# Patient Record
(demographics unavailable — no encounter records)

---

## 2024-10-08 NOTE — PHYSICAL EXAM
[de-identified] : left lateral rotation 75 degrees [TWNoteComboBox6] : right lateral rotation 75 degrees [] : positive Boom test reproduction of pain into groin [de-identified] : +SIJ compression test

## 2024-10-08 NOTE — HISTORY OF PRESENT ILLNESS
[Neck] : neck [10] : 10 [Dull/Aching] : dull/aching [Radiating] : radiating [Tightness] : tightness [Constant] : constant [Household chores] : household chores [Leisure] : leisure [Work] : work [Sleep] : sleep [Meds] : meds [Injection therapy] : injection therapy [FreeTextEntry1] : 10/08/2024: follow up today for C7-T1 GAYLE on 9/25/24 with 60% relief.  Pain better over the neck and trap however intensity improved. no weakness. no n/t. She completed PT and made her pain worse at the time. Celebrex PRN. The sensitivity at the base of her head has improved, but still present. (worried about occipital nerve block and interference with vertigo) Jaimee in [facility] lower back. Had MRI on 9/16/24: Stand up MRI:  L4/5 disc bulge and facet arthropathy, L4/S1 bulge and facet arthropathy.   08/26/2024: follow up today for follow up. Pain in the base of the neck.  Pain travels up the head and down to the trap. Pain tender when she lays down. Pain in the neck better with GAYLE's in the past.  Has had pain in the right lower back for many years, had DEBORAH's with limited relief in the past. In PT currently.  Had DEBORAH's in the past with Anusha. Taking Celebrex PRN.   05/13/2024: follow up today for GAYLE on 4/10 90% relief .  Has what sounds like occipital neuralgia.    03/12/2024: follow up today.  Pain is returning in both hands and arms.  Has numbness and tingling.  Takes Nurtec and celebrex.    12/06/2023 Follow up today C7-T1 GAYLE on 11/22/23 with 80% relief. Had a better experience with the sedation this time. Radicular pain is much better. No pain in the hands.   10/31/2023 : Patient presents for initial evaluation. She is having pain on her neck and the pain is also going to the head, shoulder and fingers. Had a GAYLE in May with little relief.  Had been seeing Dr. Tavares but insurance changed.  Has pain in head, shoulders and into fingers with numbness.   Takes celebrex.  Takes nurtec for migraines.  Botox for TMJ.  Has vestibular dysfunction with too much movement.  Headaches are returning.    Subjective Weakness: Yes Numbness/Tingling: Yes Bladder/Bowel dysfunction: No   Treatments Tried:   Physical therapy, acupuncture, Epidurals.    Attempted modalities for current pain complaint:  See above:    Medications: Yes Injections: Yes  (GAYLE ) 11/22/23, 4/10/24 Previous Spine Surgery: N/o   Imaging:  MRI Cervical Spine (7/27/22) -C2-3 C3-4 C4-5 C5-C6 C6-C7 disc herniations deforming the thecal sac with C3-4 C6-7 cord abutment cervical spine straightening slight right inferior cerebellar tonsillar displacement measuring approximately 1 to 2 mm associated with borderline tonsillar ectopia without evidence of syringohydromyelia C1-2 ligamentous hypertrophy deforming the thecal sac [4] : 4 [] : no [FreeTextEntry6] : pressure, headaches  [FreeTextEntry7] : back to the head [de-identified] : movement, working out arms.  [de-identified] : MRI

## 2024-10-08 NOTE — PHYSICAL EXAM
[de-identified] : left lateral rotation 75 degrees [TWNoteComboBox6] : right lateral rotation 75 degrees [] : positive Boom test reproduction of pain into groin [de-identified] : +SIJ compression test

## 2024-10-08 NOTE — HISTORY OF PRESENT ILLNESS
[Neck] : neck [10] : 10 [Dull/Aching] : dull/aching [Radiating] : radiating [Tightness] : tightness [Constant] : constant [Household chores] : household chores [Leisure] : leisure [Work] : work [Sleep] : sleep [Meds] : meds [Injection therapy] : injection therapy [FreeTextEntry1] : 10/08/2024: follow up today for C7-T1 GAYLE on 9/25/24 with 60% relief.  Pain better over the neck and trap however intensity improved. no weakness. no n/t. She completed PT and made her pain worse at the time. Celebrex PRN. The sensitivity at the base of her head has improved, but still present. (worried about occipital nerve block and interference with vertigo) Jaimee in [facility] lower back. Had MRI on 9/16/24: Stand up MRI:  L4/5 disc bulge and facet arthropathy, L4/S1 bulge and facet arthropathy.   08/26/2024: follow up today for follow up. Pain in the base of the neck.  Pain travels up the head and down to the trap. Pain tender when she lays down. Pain in the neck better with GAYLE's in the past.  Has had pain in the right lower back for many years, had DEBORAH's with limited relief in the past. In PT currently.  Had DEBORAH's in the past with Anusha. Taking Celebrex PRN.   05/13/2024: follow up today for GAYLE on 4/10 90% relief .  Has what sounds like occipital neuralgia.    03/12/2024: follow up today.  Pain is returning in both hands and arms.  Has numbness and tingling.  Takes Nurtec and celebrex.    12/06/2023 Follow up today C7-T1 GAYLE on 11/22/23 with 80% relief. Had a better experience with the sedation this time. Radicular pain is much better. No pain in the hands.   10/31/2023 : Patient presents for initial evaluation. She is having pain on her neck and the pain is also going to the head, shoulder and fingers. Had a GAYLE in May with little relief.  Had been seeing Dr. Tavares but insurance changed.  Has pain in head, shoulders and into fingers with numbness.   Takes celebrex.  Takes nurtec for migraines.  Botox for TMJ.  Has vestibular dysfunction with too much movement.  Headaches are returning.    Subjective Weakness: Yes Numbness/Tingling: Yes Bladder/Bowel dysfunction: No   Treatments Tried:   Physical therapy, acupuncture, Epidurals.    Attempted modalities for current pain complaint:  See above:    Medications: Yes Injections: Yes  (GAYLE ) 11/22/23, 4/10/24 Previous Spine Surgery: N/o   Imaging:  MRI Cervical Spine (7/27/22) -C2-3 C3-4 C4-5 C5-C6 C6-C7 disc herniations deforming the thecal sac with C3-4 C6-7 cord abutment cervical spine straightening slight right inferior cerebellar tonsillar displacement measuring approximately 1 to 2 mm associated with borderline tonsillar ectopia without evidence of syringohydromyelia C1-2 ligamentous hypertrophy deforming the thecal sac [4] : 4 [] : no [FreeTextEntry6] : pressure, headaches  [FreeTextEntry7] : back to the head [de-identified] : movement, working out arms.  [de-identified] : MRI

## 2024-10-08 NOTE — ASSESSMENT
[FreeTextEntry1] : After discussing various treatment options with the patient including but not limited to oral medications, physical therapy, exercise, modalities as well as interventional spinal injections, we have decided with the following plan:  1) Intervention Injection Therapy: I personally reviewed the MRI/CT scan images and agree with the radiologist's report. The radiological findings were discussed with the patient. The risks, benefits, contents and alternatives to injection were explained in full to the patient. Risks outlined include but are not limited to infection,sepsis, bleeding, post-dural puncture headache, nerve damage, temporary increase in pain, syncopal episode, failure to resolve symptoms, allergic reaction, symptom recurrence, and elevation of blood sugar in diabetics. Cortisone may cause immunosuppression. Patient understands the risks. All questions were answered. After discussion of options, patient requested an injection. Information regarding the injection was given to the patient. Which medications to stop prior to the injection was explained to the patient as well.  Follow up in 1-2 weeks post injection for re-evaluation.  Continue Home exercises, stretching, activity modification, physical therapy, and conservative care. Patient is presenting with acute/sub-acute radicular pain with impairment in ADLs and functionality.  The pain has not responded sufficiently to  conservative care including nsaid therapy and/or physical therapy.  There is no bleeding tendency, unstable medical condition, or systemic infection. The purpose of the spinal injections is to facilitate active therapy by providing short term relief through reduction of pain and inflammation.   Injections, by themselves, are not likely to provide long-term relief. Rather, active rehabilitation with modified work achieves long-term relief by increasing active ROM, strength and stability.   C7-T1 Cervical Epidural Steroid Injection under fluoroscopic guidance with image. Of note, the C7-T1 level has been determined to be the safest level to inject in the cervical spine as the epidural space is the largest. Despite pathology at different levels, studies have shown that the medication will spread up to the most cranial level, despite the level of injection. - will call   right SIJ injection

## 2024-10-08 NOTE — DATA REVIEWED
[Cervical Spine] : cervical spine [MRI] : MRI [Lumbar Spine] : lumbar spine [Report was reviewed and noted in the chart] : The report was reviewed and noted in the chart [I independently reviewed and interpreted images and report] : I independently reviewed and interpreted images and report [I reviewed the films/CD and agree] : I reviewed the films/CD and agree

## 2024-10-08 NOTE — ASSESSMENT
[FreeTextEntry1] : After discussing various treatment options with the patient including but not limited to oral medications, physical therapy, exercise, modalities as well as interventional spinal injections, we have decided with the following plan:  1) Intervention Injection Therapy: I personally reviewed the MRI/CT scan images and agree with the radiologist's report. The radiological findings were discussed with the patient. The risks, benefits, contents and alternatives to injection were explained in full to the patient. Risks outlined include but are not limited to infection,sepsis, bleeding, post-dural puncture headache, nerve damage, temporary increase in pain, syncopal episode, failure to resolve symptoms, allergic reaction, symptom recurrence, and elevation of blood sugar in diabetics. Cortisone may cause immunosuppression. Patient understands the risks. All questions were answered. After discussion of options, patient requested an injection. Information regarding the injection was given to the patient. Which medications to stop prior to the injection was explained to the patient as well.  Follow up in 1-2 weeks post injection for re-evaluation.  Continue Home exercises, stretching, activity modification, physical therapy, and conservative care. Patient is presenting with acute/sub-acute radicular pain with impairment in ADLs and functionality.  The pain has not responded sufficiently to  conservative care including nsaid therapy and/or physical therapy.  There is no bleeding tendency, unstable medical condition, or systemic infection. The purpose of the spinal injections is to facilitate active therapy by providing short term relief through reduction of pain and inflammation.   Injections, by themselves, are not likely to provide long-term relief. Rather, active rehabilitation with modified work achieves long-term relief by increasing active ROM, strength and stability.   C7-T1 Cervical Epidural Steroid Injection under fluoroscopic guidance with image. Of note, the C7-T1 level has been determined to be the safest level to inject in the cervical spine as the epidural space is the largest. Despite pathology at different levels, studies have shown that the medication will spread up to the most cranial level, despite the level of injection. - will call   right SIJ injection   no

## 2025-01-14 NOTE — HISTORY OF PRESENT ILLNESS
[FreeTextEntry1] : Flu vaccination [de-identified] : Patient presents to office for flu vaccination. She feels well and denies any cough, fever, chills, or recent URI

## 2025-02-05 NOTE — REVIEW OF SYSTEMS
[Negative] : Heme/Lymph [Feeling Tired] : feeling tired [Eyesight Problems] : eyesight problems [Dry Eyes] : dryness of the eyes [Abdominal Pain] : abdominal pain [Constipation] : constipation [Heartburn] : heartburn [Loss of interest] : loss of interest in sexual activity [Urine Infection (bladder/kidney)] : bladder/kidney infection [Told you have blood in urine on a urine test] : told blood was present in a urine test [Bladder pressure] : experiences bladder pressure [Joint Pain] : joint pain [Joint Swelling] : joint swelling [Dizziness] : dizziness

## 2025-02-06 NOTE — PHYSICAL EXAM
[Normal Appearance] : normal appearance [Well Groomed] : well groomed [General Appearance - In No Acute Distress] : no acute distress [Respiration, Rhythm And Depth] : normal respiratory rhythm and effort [Exaggerated Use Of Accessory Muscles For Inspiration] : no accessory muscle use [Abdomen Soft] : soft [Abdomen Tenderness] : non-tender [Costovertebral Angle Tenderness] : no ~M costovertebral angle tenderness [Normal Station and Gait] : the gait and station were normal for the patient's age [] : no rash [Oriented To Time, Place, And Person] : oriented to person, place, and time [Affect] : the affect was normal [Mood] : the mood was normal

## 2025-02-06 NOTE — ASSESSMENT
[FreeTextEntry1] : 50-year-old female with mild OAB symptoms and simple renal cyst. CT scan done at [facility] 1/21/2025: Small left interpolar simple cyst.  Patient not bothered by urinary symptoms and wishes to observe.  We discussed follow-up imaging in 1 year.  The patient and I discussed some of the possible causes for frequency and urgency which the patient understood. The causes include environmental/behavioral causes (caffeine, alcohol, spicy, acidic foods; increased fluid intake, stress, weather, dysfunctional voiding, constipation), systemic illness (such as DM, CHF, LE edema, SIADH), infectious causes (UTI, STD's), neurologic disorders (stroke, spinal cord injury, multiple sclerosis), functional urologic disorders (Primary BNO, Pelvic floor dysfunction, detrusor overactivity), gynecologic issues (endometriosis, ovarian cysts and fibroids, pelvic masses), and anatomic urologic disorders (urethral strictures, pelvic prolapse, stress induced detrusor overactivity).   I have explained to the patient that her issue is not dangerous. As such treatment is dependent on the amount of bother the patient has.

## 2025-02-06 NOTE — HISTORY OF PRESENT ILLNESS
[FreeTextEntry1] :  Ms. ESPINOZA is a 55 year-year-old Unknown F consulting for frequency and urgency. , past medical history remarkable for hysterectomy. Endorsing frequency more than once every hour and urgency, no incontinence.  Not bothered by symptoms. Had CT scan done at [facility] 2025: Small left interpolar simple cyst. Denies dysuria, fever, chills, or neurologic symptoms.

## 2025-03-12 NOTE — ASSESSMENT
[FreeTextEntry1] : Normal exam Advised regular aerobic activity and heart healthy diet Check routine blood work

## 2025-03-12 NOTE — HEALTH RISK ASSESSMENT
[Little interest or pleasure doing things] : 1) Little interest or pleasure doing things [Feeling down, depressed, or hopeless] : 2) Feeling down, depressed, or hopeless [0] : 2) Feeling down, depressed, or hopeless: Not at all (0) [PHQ-2 Negative - No further assessment needed] : PHQ-2 Negative - No further assessment needed [[id number]] : 0 [Patient reported mammogram was normal] : Patient reported mammogram was normal [Patient reported PAP Smear was normal] : Patient reported PAP Smear was normal [Patient reported colonoscopy was normal] : Patient reported colonoscopy was normal [MammogramDate] : 11/2024 [PapSmearDate] : 2024 [ColonoscopyDate] : 8/2023

## 2025-03-12 NOTE — HISTORY OF PRESENT ILLNESS
[de-identified] : Patient presents to office for annual wellness exam. Overall she feels well and denies any exertional chest pain, shortness of breath, palpitations.

## 2025-03-17 NOTE — HISTORY OF PRESENT ILLNESS
[de-identified] : Patient with h/o TMJ - uses mouth guard, multiple thyroid nodules, bilateral ETD, dizziness, and chronic rhinitis presents today for a follow up.

## 2025-03-17 NOTE — ADDENDUM
[FreeTextEntry1] :  Documented by Reggie Schmidt acting as scribe for Dr. Soto on 03/17/2025. All Medical record entries made by the Scribe were at my, Dr. Soto, direction and personally dictated by me on 03/17/2025 . I have reviewed the chart and agree that the record accurately reflects my personal performance of the history, physical exam, assessment and plan. I have also personally directed, reviewed, and agreed with the discharge instructions.

## 2025-03-17 NOTE — PHYSICAL EXAM
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

## 2025-03-17 NOTE — ASSESSMENT
[FreeTextEntry1] :  Reviewed and reconciled medications, allergies, PMHx, PSHx, SocHx, FMHx.  Patient with h/o TMJ - uses mouth guard, multiple thyroid nodules, bilateral ETD, dizziness, and chronic rhinitis presents today for a follow up.  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

## 2025-03-17 NOTE — CONSULT LETTER
[Dear  ___] : Dear  [unfilled], [Courtesy Letter:] : I had the pleasure of seeing your patient, [unfilled], in my office today. [Please see my note below.] : Please see my note below. [Consult Closing:] : Thank you very much for allowing me to participate in the care of this patient.  If you have any questions, please do not hesitate to contact me. [Sincerely,] : Sincerely, [FreeTextEntry3] :  Laureano Soto MD FACS

## 2025-03-18 NOTE — DATA REVIEWED
[FreeTextEntry1] : Bilateral mammogram ([facility]) 11/04/2024  Extremely dense.  Bilateral breast ultrasound:  11/04/2024  Benign appearing cysts  (Images uploaded and reviewed.  There were only ultrasound images)   Risk models TC v8 5 year= 1.5% Lifetime=12.8%                     Urvashi model 5 year= 1.0%  Lifetime= 8.9%

## 2025-03-18 NOTE — HISTORY OF PRESENT ILLNESS
[FreeTextEntry1] : This is a 55 year old  female who was seen initially in Feb 2021.  She complained of dense, lumpy breasts.  She has always had painful lumpy breasts. Her left breast was always valencia and more painful. She had a hysterectomy.  She has no current breast complaints and denies any pain.

## 2025-03-18 NOTE — PHYSICAL EXAM
[EOMI] : extra ocular movement intact [Supple] : supple [No Supraclavicular Adenopathy] : no supraclavicular adenopathy [No Cervical Adenopathy] : no cervical adenopathy [No Thyromegaly] : no thyromegaly [Clear to Auscultation Bilat] : clear to auscultation bilaterally [Normal Sinus Rhythm] : normal sinus rhythm [Examined in the supine and seated position] : examined in the supine and seated position [No dominant masses] : no dominant masses in right breast  [No dominant masses] : no dominant masses left breast [No Nipple Retraction] : no left nipple retraction [No Nipple Discharge] : no left nipple discharge [No Axillary Lymphadenopathy] : no left axillary lymphadenopathy [Soft] : abdomen soft [Not Tender] : non-tender [No Palpable Masses] : no abdominal mass palpated [de-identified] : Scattered breast tenderness and nodularity. [de-identified] : Glandular UOQ. [de-identified] : Glandular UOQ

## 2025-03-18 NOTE — PAST MEDICAL HISTORY
[Perimenopausal] : The patient is perimenopausal [Menarche Age ____] : age at menarche was [unfilled] [Total Preg ___] : G[unfilled] [Live Births ___] : P[unfilled]  [Age At Live Birth ___] : Age at live birth: [unfilled] [FreeTextEntry2] : son, daughter [FreeTextEntry7] : no [FreeTextEntry8] : no

## 2025-03-18 NOTE — PHYSICAL EXAM
[EOMI] : extra ocular movement intact [Supple] : supple [No Supraclavicular Adenopathy] : no supraclavicular adenopathy [No Cervical Adenopathy] : no cervical adenopathy [No Thyromegaly] : no thyromegaly [Clear to Auscultation Bilat] : clear to auscultation bilaterally [Normal Sinus Rhythm] : normal sinus rhythm [Examined in the supine and seated position] : examined in the supine and seated position [No dominant masses] : no dominant masses in right breast  [No dominant masses] : no dominant masses left breast [No Nipple Retraction] : no left nipple retraction [No Nipple Discharge] : no left nipple discharge [No Axillary Lymphadenopathy] : no left axillary lymphadenopathy [Soft] : abdomen soft [Not Tender] : non-tender [No Palpable Masses] : no abdominal mass palpated [de-identified] : Scattered breast tenderness and nodularity. [de-identified] : Glandular UOQ. [de-identified] : Glandular UOQ

## 2025-03-18 NOTE — PHYSICAL EXAM
[EOMI] : extra ocular movement intact [Supple] : supple [No Supraclavicular Adenopathy] : no supraclavicular adenopathy [No Cervical Adenopathy] : no cervical adenopathy [No Thyromegaly] : no thyromegaly [Clear to Auscultation Bilat] : clear to auscultation bilaterally [Normal Sinus Rhythm] : normal sinus rhythm [Examined in the supine and seated position] : examined in the supine and seated position [No dominant masses] : no dominant masses in right breast  [No dominant masses] : no dominant masses left breast [No Nipple Retraction] : no left nipple retraction [No Nipple Discharge] : no left nipple discharge [No Axillary Lymphadenopathy] : no left axillary lymphadenopathy [Soft] : abdomen soft [Not Tender] : non-tender [No Palpable Masses] : no abdominal mass palpated [de-identified] : Scattered breast tenderness and nodularity. [de-identified] : Glandular UOQ. [de-identified] : Glandular UOQ

## 2025-03-19 NOTE — HISTORY OF PRESENT ILLNESS
[Neck] : neck [4] : 4 [Dull/Aching] : dull/aching [Radiating] : radiating [Tightness] : tightness [Constant] : constant [Household chores] : household chores [Leisure] : leisure [Work] : work [Sleep] : sleep [Meds] : meds [Injection therapy] : injection therapy [9] : 9 [7] : 7 [FreeTextEntry1] : 3/19/25-  Pain returned in both arms, weakness and headaches.  Would like to schedule another GAYLE.    10/08/2024: follow up today for C7-T1 GAYLE on 9/25/24 with 60% relief.  Pain better over the neck and trap however intensity improved. no weakness. no n/t. She completed PT and made her pain worse at the time. Celebrex PRN. The sensitivity at the base of her head has improved, but still present. (worried about occipital nerve block and interference with vertigo) Jaimee in [facility] lower back. Had MRI on 9/16/24: Stand up MRI:  L4/5 disc bulge and facet arthropathy, L4/S1 bulge and facet arthropathy.   08/26/2024: follow up today for follow up. Pain in the base of the neck.  Pain travels up the head and down to the trap. Pain tender when she lays down. Pain in the neck better with GAYLE's in the past.  Has had pain in the right lower back for many years, had DEBORAH's with limited relief in the past. In PT currently.  Had DEBORAH's in the past with Anusha. Taking Celebrex PRN.   05/13/2024: follow up today for GAYLE on 4/10 90% relief .  Has what sounds like occipital neuralgia.    03/12/2024: follow up today.  Pain is returning in both hands and arms.  Has numbness and tingling.  Takes Nurtec and celebrex.    12/06/2023 Follow up today C7-T1 GAYLE on 11/22/23 with 80% relief. Had a better experience with the sedation this time. Radicular pain is much better. No pain in the hands.   10/31/2023 : Patient presents for initial evaluation. She is having pain on her neck and the pain is also going to the head, shoulder and fingers. Had a GAYLE in May with little relief.  Had been seeing Dr. Tavares but insurance changed.  Has pain in head, shoulders and into fingers with numbness.   Takes celebrex.  Takes nurtec for migraines.  Botox for TMJ.  Has vestibular dysfunction with too much movement.  Headaches are returning.    Subjective Weakness: Yes Numbness/Tingling: Yes Bladder/Bowel dysfunction: No   Treatments Tried:   Physical therapy, acupuncture, Epidurals.    Attempted modalities for current pain complaint:  See above:    Medications: Yes Injections: Yes  (GAYLE ) 11/22/23, 4/10/24 Previous Spine Surgery: N/o   Imaging:  MRI Cervical Spine (7/27/22) -C2-3 C3-4 C4-5 C5-C6 C6-C7 disc herniations deforming the thecal sac with C3-4 C6-7 cord abutment cervical spine straightening slight right inferior cerebellar tonsillar displacement measuring approximately 1 to 2 mm associated with borderline tonsillar ectopia without evidence of syringohydromyelia C1-2 ligamentous hypertrophy deforming the thecal sac [] : no [FreeTextEntry6] : pressure, headaches  [FreeTextEntry7] : back to the head [de-identified] : movement, working out arms.  [de-identified] : MRI

## 2025-03-19 NOTE — PHYSICAL EXAM
[de-identified] : left lateral rotation 75 degrees [TWNoteComboBox6] : right lateral rotation 75 degrees [] : no ecchymosis [de-identified] : +SIJ compression test

## 2025-03-19 NOTE — PHYSICAL EXAM
[de-identified] : left lateral rotation 75 degrees [TWNoteComboBox6] : right lateral rotation 75 degrees [] : no ecchymosis [de-identified] : +SIJ compression test

## 2025-03-19 NOTE — ASSESSMENT
[FreeTextEntry1] : After discussing various treatment options with the patient including but not limited to oral medications, physical therapy, exercise, modalities as well as interventional spinal injections, we have decided with the following plan:  1) Intervention Injection Therapy: I personally reviewed the MRI/CT scan images and agree with the radiologist's report. The radiological findings were discussed with the patient. The risks, benefits, contents and alternatives to injection were explained in full to the patient. Risks outlined include but are not limited to infection,sepsis, bleeding, post-dural puncture headache, nerve damage, temporary increase in pain, syncopal episode, failure to resolve symptoms, allergic reaction, symptom recurrence, and elevation of blood sugar in diabetics. Cortisone may cause immunosuppression. Patient understands the risks. All questions were answered. After discussion of options, patient requested an injection. Information regarding the injection was given to the patient. Which medications to stop prior to the injection was explained to the patient as well.  Follow up in 1-2 weeks post injection for re-evaluation.  Continue Home exercises, stretching, activity modification, physical therapy, and conservative care. Patient is presenting with acute/sub-acute radicular pain with impairment in ADLs and functionality.  The pain has not responded sufficiently to  conservative care including nsaid therapy and/or physical therapy.  There is no bleeding tendency, unstable medical condition, or systemic infection. The purpose of the spinal injections is to facilitate active therapy by providing short term relief through reduction of pain and inflammation.   Injections, by themselves, are not likely to provide long-term relief. Rather, active rehabilitation with modified work achieves long-term relief by increasing active ROM, strength and stability.   C7-T1 Cervical Epidural Steroid Injection under fluoroscopic guidance with image. Of note, the C7-T1 level has been determined to be the safest level to inject in the cervical spine as the epidural space is the largest. Despite pathology at different levels, studies have shown that the medication will spread up to the most cranial level, despite the level of injection. -  right SIJ injection

## 2025-03-19 NOTE — HISTORY OF PRESENT ILLNESS
[Neck] : neck [4] : 4 [Dull/Aching] : dull/aching [Radiating] : radiating [Tightness] : tightness [Constant] : constant [Household chores] : household chores [Leisure] : leisure [Work] : work [Sleep] : sleep [Meds] : meds [Injection therapy] : injection therapy [9] : 9 [7] : 7 [FreeTextEntry1] : 3/19/25-  Pain returned in both arms, weakness and headaches.  Would like to schedule another GAYLE.    10/08/2024: follow up today for C7-T1 GAYLE on 9/25/24 with 60% relief.  Pain better over the neck and trap however intensity improved. no weakness. no n/t. She completed PT and made her pain worse at the time. Celebrex PRN. The sensitivity at the base of her head has improved, but still present. (worried about occipital nerve block and interference with vertigo) Jaimee in [facility] lower back. Had MRI on 9/16/24: Stand up MRI:  L4/5 disc bulge and facet arthropathy, L4/S1 bulge and facet arthropathy.   08/26/2024: follow up today for follow up. Pain in the base of the neck.  Pain travels up the head and down to the trap. Pain tender when she lays down. Pain in the neck better with GAYLE's in the past.  Has had pain in the right lower back for many years, had DEBORAH's with limited relief in the past. In PT currently.  Had DEBORAH's in the past with Anusha. Taking Celebrex PRN.   05/13/2024: follow up today for GAYLE on 4/10 90% relief .  Has what sounds like occipital neuralgia.    03/12/2024: follow up today.  Pain is returning in both hands and arms.  Has numbness and tingling.  Takes Nurtec and celebrex.    12/06/2023 Follow up today C7-T1 GAYLE on 11/22/23 with 80% relief. Had a better experience with the sedation this time. Radicular pain is much better. No pain in the hands.   10/31/2023 : Patient presents for initial evaluation. She is having pain on her neck and the pain is also going to the head, shoulder and fingers. Had a GAYLE in May with little relief.  Had been seeing Dr. Tavares but insurance changed.  Has pain in head, shoulders and into fingers with numbness.   Takes celebrex.  Takes nurtec for migraines.  Botox for TMJ.  Has vestibular dysfunction with too much movement.  Headaches are returning.    Subjective Weakness: Yes Numbness/Tingling: Yes Bladder/Bowel dysfunction: No   Treatments Tried:   Physical therapy, acupuncture, Epidurals.    Attempted modalities for current pain complaint:  See above:    Medications: Yes Injections: Yes  (GAYLE ) 11/22/23, 4/10/24 Previous Spine Surgery: N/o   Imaging:  MRI Cervical Spine (7/27/22) -C2-3 C3-4 C4-5 C5-C6 C6-C7 disc herniations deforming the thecal sac with C3-4 C6-7 cord abutment cervical spine straightening slight right inferior cerebellar tonsillar displacement measuring approximately 1 to 2 mm associated with borderline tonsillar ectopia without evidence of syringohydromyelia C1-2 ligamentous hypertrophy deforming the thecal sac [] : no [FreeTextEntry6] : pressure, headaches  [FreeTextEntry7] : back to the head [de-identified] : movement, working out arms.  [de-identified] : MRI

## 2025-04-30 NOTE — PROCEDURE
[FreeTextEntry3] : Date of Service: 04/30/2025   Account: [id number]   Patient: KRISTINA ESPINOZA   YOB: 1969   Age: 55 year     Surgeon:                                                      Ilene Johnston M.D.   Pre-Operative Diagnosis:                          Cervical Radiculopathy (M54.12)   Post Operative Diagnosis:                        Cervical Radiculopathy (M54.12)   Procedure:                                                  Interlaminar cervical epidural steroid injection (C7-T1) under fluoroscopic guidance   Anesthesia:                                                 MAC   This procedure was carried out using fluoroscopic guidance.  The risks and benefits of the procedure were discussed extensively with the patient.  The consent of the patient was obtained and the following procedure was performed.   The patient was placed in the prone position using a thoracic and chin support.  The cervical area was prepped and draped in a sterile fashion.  The fluoroscope visualized the C7-T1 interspace using slight cephalad-caudad angulation and this area was marked.  Using sterile technique the superficial skin was anesthetized with 1% Lidocaine without epinephrine.  A 18 gauge Tuohoy needle was advanced under fluoroscopy using [id number]-[id number] technique until ligament was engaged.  The stilette was then removed and a column of preservative free normal saline flushed throught the tuohoy needle and left with a concave fluid level above the butterfly portion of the tuohoy needle.  The needle was then advanced under fluoroscopic guidance until the column of saline disappeared.  Lateral view confirmed final needle tip placement in the epidural space.  After negative aspiration for heme and CSF, 1 cc of Omnipaque confirmed good cervical epiduragram.   Cervical epidurogram showed no evidence of intrathecal or intravascular flow, and good bilateral epidural flow from C3 to T2 levels.  An injectate of 6 cc of preservative free normal saline plus 12 mg of betamethasone was then injected into the epidural space. The needle was subsequently removed and pressure was applied.  Anesthesia was present throughout the procedure.   The patient tolerated the procedure well and was instructed to contact me immediately if there were any problems.   Ilene Johnston M.D.

## 2025-05-06 NOTE — PHYSICAL EXAM
[Normal Appearance] : normal appearance [Well Groomed] : well groomed [General Appearance - In No Acute Distress] : no acute distress [Normal Conjunctiva] : the conjunctiva exhibited no abnormalities [Normal Oral Mucosa] : normal oral mucosa [Normal Jugular Venous V Waves Present] : normal jugular venous V waves present [FreeTextEntry1] : No JVD [Respiration, Rhythm And Depth] : normal respiratory rhythm and effort [Exaggerated Use Of Accessory Muscles For Inspiration] : no accessory muscle use [Auscultation Breath Sounds / Voice Sounds] : lungs were clear to auscultation bilaterally [Bowel Sounds] : normal bowel sounds [Abdomen Soft] : soft [Abdomen Tenderness] : non-tender [Abnormal Walk] : normal gait [Gait - Sufficient For Exercise Testing] : the gait was sufficient for exercise testing [Nail Clubbing] : no clubbing of the fingernails [Cyanosis, Localized] : no localized cyanosis [Petechial Hemorrhages (___cm)] : no petechial hemorrhages [Skin Color & Pigmentation] : normal skin color and pigmentation [] : no rash [No Venous Stasis] : no venous stasis [Skin Lesions] : no skin lesions [Oriented To Time, Place, And Person] : oriented to person, place, and time [Affect] : the affect was normal [Mood] : the mood was normal [No Anxiety] : not feeling anxious [Normal] : normal [No Precordial Heave] : no precordial heave was noted [Normal Rate] : normal [Rhythm Regular] : regular [Normal S1] : normal S1 [Normal S2] : normal S2 [No Gallop] : no gallop heard [No Murmur] : no murmurs heard [Right Carotid Bruit] : no bruit heard over the right carotid [Left Carotid Bruit] : no bruit heard over the left carotid [2+] : left 2+ [No Abnormalities] : the abdominal aorta was not enlarged and no bruit was heard [Bruit] : no bruit heard [No Pitting Edema] : no pitting edema present

## 2025-05-06 NOTE — HISTORY OF PRESENT ILLNESS
[FreeTextEntry1] : This is a 55 year old woman with a history of migraine headaches who presents to the office for follow up.  She also has multiple spinal issues and back pain.  She has been under a lot of stress.  She feels increased palpitations.  She is at time dizzy.  She thinks she is hydrating well and is not drinking excessive caffeine.  She denies dyspnea, PND, orthopnea, LE swelling,  or syncope.  No chest pain.   IT has been a long time since her last noninvasive cardiac evaluation.  The symptoms have been going on for about six weeks.  She had blood work recently, but has not had her thyroid checked.

## 2025-05-06 NOTE — DISCUSSION/SUMMARY
[FreeTextEntry1] : This is a 55 year old woman with a history of migraine headaches who presents to the office for follow up.  She also has multiple spinal issues and back pain.  She has been under a lot of stress.  She feels increased palpitations.  She is at time dizzy.   I am placing a two week Zio monitor.  She will have an echocardiogram.  I am checking her thyroid.  We discussed the benefits of a healthy diet, regular exercise and physical activity, and weight loss in detail.  I will call with the results, and to discuss follow up.  [EKG obtained to assist in diagnosis and management of assessed problem(s)] : EKG obtained to assist in diagnosis and management of assessed problem(s)

## 2025-05-06 NOTE — REASON FOR VISIT
[Follow-Up - Clinic] : a clinic follow-up of [Chest Pain] : chest pain [Dizziness] : dizziness [Dyspnea] : dyspnea [Palpitations] : palpitations [Spouse] : spouse

## 2025-05-06 NOTE — CARDIOLOGY SUMMARY
[de-identified] : NSR [___] : [unfilled] [LVEF ___%] : LVEF [unfilled]% [None] : no pulmonary hypertension [Normal] : normal LA size [Mild] : mild mitral regurgitation

## 2025-05-13 NOTE — HISTORY OF PRESENT ILLNESS
[Neck] : neck [9] : 9 [7] : 7 [Dull/Aching] : dull/aching [Radiating] : radiating [Tightness] : tightness [Household chores] : household chores [Leisure] : leisure [Work] : work [Sleep] : sleep [Meds] : meds [Injection therapy] : injection therapy [FreeTextEntry6] : pressure, headaches  [2] : 2 [Constant] : constant [FreeTextEntry1] : 5/13/25- fu for GAYLE on 4/30 with 50% relief.  Has headaches.  Saw Dr. Shannon and he recommended occipital nerve blocks.  Has a history of dizziness.  Takes celebrex.    3/19/25-  Pain returned in both arms, weakness and headaches.  Would like to schedule another GAYLE.    10/08/2024: follow up today for C7-T1 GAYLE on 9/25/24 with 60% relief.  Pain better over the neck and trap however intensity improved. no weakness. no n/t. She completed PT and made her pain worse at the time. Celebrex PRN. The sensitivity at the base of her head has improved, but still present. (worried about occipital nerve block and interference with vertigo) [facility]. Had MRI on 9/16/24: Stand up MRI:  L4/5 disc bulge and facet arthropathy, L4/S1 bulge and facet arthropathy.   08/26/2024: follow up today for follow up. Pain in the base of the neck.  Pain travels up the head and down to the trap. Pain tender when she lays down. Pain in the neck better with GAYLE's in the past.  Has had pain in the right lower back for many years, had DEBORAH's with limited relief in the past. In PT currently.  Had DEBORAH's in the past with Anusha. Taking Celebrex PRN.   05/13/2024: follow up today for GAYLE on 4/10 90% relief .  Has what sounds like occipital neuralgia.    03/12/2024: follow up today.  Pain is returning in both hands and arms.  Has numbness and tingling.  Takes Nurtec and celebrex.    12/06/2023 Follow up today C7-T1 GAYLE on 11/22/23 with 80% relief. Had a better experience with the sedation this time. Radicular pain is much better. No pain in the hands.   10/31/2023 : Patient presents for initial evaluation. She is having pain on her neck and the pain is also going to the head, shoulder and fingers. Had a GAYLE in May with little relief.  Had been seeing Dr. Tavares but insurance changed.  Has pain in head, shoulders and into fingers with numbness.   Takes celebrex.  Takes nurtec for migraines.  Botox for TMJ.  Has vestibular dysfunction with too much movement.  Headaches are returning.    Subjective Weakness: Yes Numbness/Tingling: Yes Bladder/Bowel dysfunction: No   Treatments Tried:   Physical therapy, acupuncture, Epidurals.    Attempted modalities for current pain complaint:  See above:    Medications: Yes Injections: Yes  (GAYLE ) 11/22/23, 4/10/24 Previous Spine Surgery: N/o   Imaging:  MRI Cervical Spine (7/27/22) -C2-3 C3-4 C4-5 C5-C6 C6-C7 disc herniations deforming the thecal sac with C3-4 C6-7 cord abutment cervical spine straightening slight right inferior cerebellar tonsillar displacement measuring approximately 1 to 2 mm associated with borderline tonsillar ectopia without evidence of syringohydromyelia C1-2 ligamentous hypertrophy deforming the thecal sac [] : no [FreeTextEntry7] : back to the head [de-identified] : movement, working out arms.  [de-identified] : MRI

## 2025-05-13 NOTE — PHYSICAL EXAM
[de-identified] : left lateral rotation 75 degrees [TWNoteComboBox6] : right lateral rotation 75 degrees [] : no ecchymosis [de-identified] : +SIJ compression test

## 2025-05-13 NOTE — PHYSICAL EXAM
[de-identified] : left lateral rotation 75 degrees [TWNoteComboBox6] : right lateral rotation 75 degrees [] : no ecchymosis [de-identified] : +SIJ compression test

## 2025-05-13 NOTE — HISTORY OF PRESENT ILLNESS
[Neck] : neck [9] : 9 [7] : 7 [Dull/Aching] : dull/aching [Radiating] : radiating [Tightness] : tightness [Household chores] : household chores [Leisure] : leisure [Work] : work [Sleep] : sleep [Meds] : meds [Injection therapy] : injection therapy [FreeTextEntry6] : pressure, headaches  [2] : 2 [Constant] : constant [FreeTextEntry1] : 5/13/25- fu for GAYLE on 4/30 with 50% relief.  Has headaches.  Saw Dr. Shannon and he recommended occipital nerve blocks.  Has a history of dizziness.  Takes celebrex.    3/19/25-  Pain returned in both arms, weakness and headaches.  Would like to schedule another GAYLE.    10/08/2024: follow up today for C7-T1 GAYLE on 9/25/24 with 60% relief.  Pain better over the neck and trap however intensity improved. no weakness. no n/t. She completed PT and made her pain worse at the time. Celebrex PRN. The sensitivity at the base of her head has improved, but still present. (worried about occipital nerve block and interference with vertigo) [facility]. Had MRI on 9/16/24: Stand up MRI:  L4/5 disc bulge and facet arthropathy, L4/S1 bulge and facet arthropathy.   08/26/2024: follow up today for follow up. Pain in the base of the neck.  Pain travels up the head and down to the trap. Pain tender when she lays down. Pain in the neck better with GAYLE's in the past.  Has had pain in the right lower back for many years, had DEBORAH's with limited relief in the past. In PT currently.  Had DEBORAH's in the past with Anusha. Taking Celebrex PRN.   05/13/2024: follow up today for GAYLE on 4/10 90% relief .  Has what sounds like occipital neuralgia.    03/12/2024: follow up today.  Pain is returning in both hands and arms.  Has numbness and tingling.  Takes Nurtec and celebrex.    12/06/2023 Follow up today C7-T1 GAYLE on 11/22/23 with 80% relief. Had a better experience with the sedation this time. Radicular pain is much better. No pain in the hands.   10/31/2023 : Patient presents for initial evaluation. She is having pain on her neck and the pain is also going to the head, shoulder and fingers. Had a GAYLE in May with little relief.  Had been seeing Dr. Tavares but insurance changed.  Has pain in head, shoulders and into fingers with numbness.   Takes celebrex.  Takes nurtec for migraines.  Botox for TMJ.  Has vestibular dysfunction with too much movement.  Headaches are returning.    Subjective Weakness: Yes Numbness/Tingling: Yes Bladder/Bowel dysfunction: No   Treatments Tried:   Physical therapy, acupuncture, Epidurals.    Attempted modalities for current pain complaint:  See above:    Medications: Yes Injections: Yes  (GAYLE ) 11/22/23, 4/10/24 Previous Spine Surgery: N/o   Imaging:  MRI Cervical Spine (7/27/22) -C2-3 C3-4 C4-5 C5-C6 C6-C7 disc herniations deforming the thecal sac with C3-4 C6-7 cord abutment cervical spine straightening slight right inferior cerebellar tonsillar displacement measuring approximately 1 to 2 mm associated with borderline tonsillar ectopia without evidence of syringohydromyelia C1-2 ligamentous hypertrophy deforming the thecal sac [] : no [FreeTextEntry7] : back to the head [de-identified] : movement, working out arms.  [de-identified] : MRI

## 2025-05-13 NOTE — ASSESSMENT
[FreeTextEntry1] : After discussing various treatment options with the patient including but not limited to oral medications, physical therapy, exercise, modalities as well as interventional spinal injections, we have decided with the following plan:  1) Intervention Injection Therapy: I personally reviewed the MRI/CT scan images and agree with the radiologist's report. The radiological findings were discussed with the patient. The risks, benefits, contents and alternatives to injection were explained in full to the patient. Risks outlined include but are not limited to infection,sepsis, bleeding, post-dural puncture headache, nerve damage, temporary increase in pain, syncopal episode, failure to resolve symptoms, allergic reaction, symptom recurrence, and elevation of blood sugar in diabetics. Cortisone may cause immunosuppression. Patient understands the risks. All questions were answered. After discussion of options, patient requested an injection. Information regarding the injection was given to the patient. Which medications to stop prior to the injection was explained to the patient as well.  Follow up in 1-2 weeks post injection for re-evaluation.  Continue Home exercises, stretching, activity modification, physical therapy, and conservative care. Patient is presenting with acute/sub-acute radicular pain with impairment in ADLs and functionality.  The pain has not responded sufficiently to  conservative care including nsaid therapy and/or physical therapy.  There is no bleeding tendency, unstable medical condition, or systemic infection. The purpose of the spinal injections is to facilitate active therapy by providing short term relief through reduction of pain and inflammation.   Injections, by themselves, are not likely to provide long-term relief. Rather, active rehabilitation with modified work achieves long-term relief by increasing active ROM, strength and stability.   C7-T1 Cervical Epidural Steroid Injection under fluoroscopic guidance with image. Of note, the C7-T1 level has been determined to be the safest level to inject in the cervical spine as the epidural space is the largest. Despite pathology at different levels, studies have shown that the medication will spread up to the most cranial level, despite the level of injection. -  right SIJ injection   B/L occipital nerve block- will call

## 2025-06-11 NOTE — PHYSICAL EXAM
[General Appearance - Well Developed] : well developed [Heart Rate And Rhythm] : heart rate and rhythm were normal [] : no respiratory distress [Abdomen Soft] : soft [Urethral Meatus] : the meatus of the urethra showed no abnormalities [External Female Genitalia] : normal external genitalia [Normal Station and Gait] : the gait and station were normal for the patient's age [Skin Color & Pigmentation] : normal skin color and pigmentation [MA] : MA [de-identified] : deferred pelvic exam [FreeTextEntry2] : [place]

## 2025-06-11 NOTE — PHYSICAL EXAM
[General Appearance - Well Developed] : well developed [Heart Rate And Rhythm] : heart rate and rhythm were normal [] : no respiratory distress [Abdomen Soft] : soft [Urethral Meatus] : the meatus of the urethra showed no abnormalities [External Female Genitalia] : normal external genitalia [Normal Station and Gait] : the gait and station were normal for the patient's age [Skin Color & Pigmentation] : normal skin color and pigmentation [MA] : MA [de-identified] : deferred pelvic exam [FreeTextEntry2] : [place]

## 2025-06-11 NOTE — ASSESSMENT
[FreeTextEntry1] : PVR (frequency) 50ml-pt soft non distended-deferred CIC/mcmullen catheter. Will repeat next visit  1. Dysuria- UA/UC/BV/yeast panel sent today will f/u with results. ER warning signs of UTI reviewed.   2.kidney cyst- US (1/025)  Small left interpolar simple cyst.-followed by Dr. Hurt  3.?OAB- followed by Dr. Hurt-pt deferred medications. Patient not bothered by urinary symptoms and wishes to observe.   The risks and benefits of treatment plan were discussed in detail with the patient who understands and wishes to proceed with plan. More than 50% was spent on counseling/coordination the care of the patient, regarding treatment options/surgical management.

## 2025-06-11 NOTE — HISTORY OF PRESENT ILLNESS
[Urinary Urgency] : urinary urgency [Urinary Frequency] : urinary frequency [Dysuria] : dysuria [FreeTextEntry1] :   CC: dysuria   Ms. ESPINOZA is a 55 year-year-old Unknown F consulting for dysuria and frequency, urgency. , past medical history remarkable for hysterectomy. Endorsing dysuria, urinary frequency, urgency and vaginal pruritus. Not on any urological medication. MS ESPINOZA  states she gets steroid injections in neck and notices after her injections she often develops urinary symptoms. Denies any flank pain, hematuria, fevers, chills, MIKAELA/UI  Had CT scan done at [facility] 2025: Small left interpolar simple cyst.  [Urinary Incontinence] : no urinary incontinence [Urinary Retention] : no urinary retention [Straining] : no straining [Weak Stream] : no weak stream [Hematuria - Gross] : no gross hematuria